# Patient Record
Sex: MALE | Race: WHITE | Employment: FULL TIME | URBAN - METROPOLITAN AREA
[De-identification: names, ages, dates, MRNs, and addresses within clinical notes are randomized per-mention and may not be internally consistent; named-entity substitution may affect disease eponyms.]

---

## 2021-03-14 ENCOUNTER — HOSPITAL ENCOUNTER (EMERGENCY)
Facility: CLINIC | Age: 26
Discharge: HOME | End: 2021-03-14
Attending: EMERGENCY MEDICINE | Admitting: EMERGENCY MEDICINE
Payer: COMMERCIAL

## 2021-03-14 VITALS
HEIGHT: 70 IN | BODY MASS INDEX: 31.5 KG/M2 | OXYGEN SATURATION: 100 % | DIASTOLIC BLOOD PRESSURE: 85 MMHG | RESPIRATION RATE: 16 BRPM | WEIGHT: 220 LBS | TEMPERATURE: 98.4 F | SYSTOLIC BLOOD PRESSURE: 139 MMHG | HEART RATE: 96 BPM

## 2021-03-14 DIAGNOSIS — S61.402A: ICD-10-CM

## 2021-03-14 PROCEDURE — 99282 EMERGENCY DEPT VISIT SF MDM: CPT

## 2021-03-14 ASSESSMENT — MIFFLIN-ST. JEOR: SCORE: 1999.16

## 2021-03-14 ASSESSMENT — ENCOUNTER SYMPTOMS: WOUND: 1

## 2021-03-14 NOTE — ED PROVIDER NOTES
"  History   Chief Complaint:  Laceration       HPI   Edgard Banuelos is a 25 year old male who presents with a laceration. The patient accidentally lacerated his left hand with pliers at 2200 last night while he was cutting metal. Last tetanus was 2020.     Review of Systems   Skin: Positive for wound (laceration to left hand).    10 systems reviewed and negative except as above and in HPI.     Allergies:  No known drug allergies     Medications:  The patient is not on any medications.     Past Medical History:    The patient has no known medical problems.    Social History:  The patient presents alone.   PCP: [facility]    Physical Exam     Patient Vitals for the past 24 hrs:   BP Temp Temp src Pulse Resp SpO2 Height Weight   03/14/21 1148 139/85 98.4  F (36.9  C) Oral 96 16 100 % 1.778 m (5' 10\") 99.8 kg (220 lb)       Physical Exam  General: Alert, No distress. Nontoxic appearance  Head: No signs of trauma.   Mouth/Throat: Oropharynx moist.   Eyes: Conjunctivae are normal. Pupils are equal..   Neck: Normal range of motion.    CV: Appears well perfused.  Resp:No respiratory distress.   MSK: Normal range of motion. No obvious deformity.   Neuro: The patient is alert and interactive. BUI. Speech normal. GCS 15  Skin: Left hand with a shallow laceration in the first web space. No active bleeding. No evidence of injury to nerves, muscles, or tendons.   Psych: normal mood and affect. behavior is normal.      Emergency Department Course     Emergency Department Course:    Reviewed:  I reviewed the patient's nursing notes, vitals, past medical records, Care Everywhere.     Assessments:  1148  I performed an exam of the patient as documented above.   1215 Patient rechecked and updated.     Disposition:  Discharged to home.      Impression & Plan     Medical Decision Making:     The patient presented with a laceration.  The wound was carefully evaluated and explored.  The laceration was not amendable " for sutures.  There is no evidence of muscular, tendon, or bony damage with this laceration.  No signs of foreign body. Tetanus up to date.  Pt did not want his wound cleaned in the ED and thus he was discharged per his request.  Follow up with primary care as noted in the discharge section.     Diagnosis:    ICD-10-CM    1. Open wound of palm, left, initial encounter  S61.402A        Scribe Disclosure:  IAlex, am serving as a scribe at 11:48 AM on 3/14/2021 to document services personally performed by Zoraida Pizarro MD based on my observations and the provider's statements to me.        Zoraida Pizarro MD  03/14/21 1913

## 2021-03-14 NOTE — DISCHARGE INSTRUCTIONS
We recommended a careful cleaning of your wound and a bandage.  Since you preferred to do this yourself at home it is very important to make sure you are washing it well and you return for any sign of infection.    If you feel your condition has changed or worsened, please call your doctor or return to the emergency department right away.

## 2021-05-01 ENCOUNTER — HEALTH MAINTENANCE LETTER (OUTPATIENT)
Age: 26
End: 2021-05-01

## 2021-10-11 ENCOUNTER — HEALTH MAINTENANCE LETTER (OUTPATIENT)
Age: 26
End: 2021-10-11

## 2022-05-22 ENCOUNTER — HEALTH MAINTENANCE LETTER (OUTPATIENT)
Age: 27
End: 2022-05-22

## 2022-09-25 ENCOUNTER — HEALTH MAINTENANCE LETTER (OUTPATIENT)
Age: 27
End: 2022-09-25

## 2023-06-04 ENCOUNTER — HEALTH MAINTENANCE LETTER (OUTPATIENT)
Age: 28
End: 2023-06-04